# Patient Record
Sex: FEMALE | URBAN - METROPOLITAN AREA
[De-identification: names, ages, dates, MRNs, and addresses within clinical notes are randomized per-mention and may not be internally consistent; named-entity substitution may affect disease eponyms.]

---

## 2019-04-17 ENCOUNTER — OFFICE VISIT (OUTPATIENT)
Dept: URBAN - METROPOLITAN AREA CLINIC 40 | Facility: CLINIC | Age: 59
End: 2019-04-17
Payer: MEDICAID

## 2019-04-17 DIAGNOSIS — H16.143 PUNCTATE KERATITIS, BILATERAL: ICD-10-CM

## 2019-04-17 DIAGNOSIS — H00.16 CHALAZION LEFT EYE, UNSPECIFIED EYELID: Primary | ICD-10-CM

## 2019-04-17 PROCEDURE — 92004 COMPRE OPH EXAM NEW PT 1/>: CPT | Performed by: OPTOMETRIST

## 2019-04-17 RX ORDER — NEOMYCIN SULFATE, POLYMYXIN B SULFATE AND DEXAMETHASONE 3.5; 10000; 1 MG/G; [USP'U]/G; MG/G
OINTMENT OPHTHALMIC
Qty: 1 | Refills: 0 | Status: ACTIVE
Start: 2019-04-17

## 2019-04-17 ASSESSMENT — INTRAOCULAR PRESSURE
OD: 11
OS: 11

## 2019-04-17 NOTE — IMPRESSION/PLAN
Impression: Chalazion left eye, unspecified eyelid: H00.16. OS. Plan: Hot compresses TID, new medication given today(Maxitrol gtts BID). RTC 1-2 months, or sooner if condition worsens.

## 2019-04-17 NOTE — IMPRESSION/PLAN
Impression: Punctate keratitis, bilateral: H16.143. OU. Plan: Pt ed re: condition. Use AT's BID-QID OU, Omega-3 fatty acids, humidifier.   Consider Restasis if condition not improved at next exam.